# Patient Record
Sex: FEMALE | Race: WHITE | NOT HISPANIC OR LATINO | Employment: FULL TIME | URBAN - METROPOLITAN AREA
[De-identification: names, ages, dates, MRNs, and addresses within clinical notes are randomized per-mention and may not be internally consistent; named-entity substitution may affect disease eponyms.]

---

## 2023-01-11 ENCOUNTER — OFFICE VISIT (OUTPATIENT)
Dept: GASTROENTEROLOGY | Facility: CLINIC | Age: 73
End: 2023-01-11
Payer: MEDICARE

## 2023-01-11 ENCOUNTER — TELEPHONE (OUTPATIENT)
Dept: GASTROENTEROLOGY | Facility: CLINIC | Age: 73
End: 2023-01-11

## 2023-01-11 VITALS
OXYGEN SATURATION: 95 % | HEIGHT: 68 IN | BODY MASS INDEX: 24.4 KG/M2 | WEIGHT: 161 LBS | SYSTOLIC BLOOD PRESSURE: 152 MMHG | DIASTOLIC BLOOD PRESSURE: 88 MMHG | HEART RATE: 75 BPM

## 2023-01-11 DIAGNOSIS — R10.13 EPIGASTRIC PAIN: ICD-10-CM

## 2023-01-11 DIAGNOSIS — K44.9 HIATAL HERNIA: ICD-10-CM

## 2023-01-11 DIAGNOSIS — Z86.010 HISTORY OF COLON POLYPS: Primary | ICD-10-CM

## 2023-01-11 DIAGNOSIS — K21.9 GASTROESOPHAGEAL REFLUX DISEASE, UNSPECIFIED WHETHER ESOPHAGITIS PRESENT: Primary | ICD-10-CM

## 2023-01-11 DIAGNOSIS — Z80.0 FAMILY HISTORY OF COLON CANCER: ICD-10-CM

## 2023-01-11 DIAGNOSIS — Z86.010 HISTORY OF COLON POLYPS: ICD-10-CM

## 2023-01-11 PROCEDURE — 99205 OFFICE O/P NEW HI 60 MIN: CPT | Mod: S$GLB,,, | Performed by: INTERNAL MEDICINE

## 2023-01-11 PROCEDURE — 99205 PR OFFICE/OUTPT VISIT, NEW, LEVL V, 60-74 MIN: ICD-10-PCS | Mod: S$GLB,,, | Performed by: INTERNAL MEDICINE

## 2023-01-11 RX ORDER — METOPROLOL SUCCINATE 50 MG/1
50 TABLET, EXTENDED RELEASE ORAL
COMMUNITY
Start: 2022-10-23 | End: 2023-08-09

## 2023-01-11 RX ORDER — GABAPENTIN 100 MG/1
CAPSULE ORAL 3 TIMES DAILY
COMMUNITY
Start: 2022-09-21 | End: 2023-08-09

## 2023-01-11 RX ORDER — ZOLPIDEM TARTRATE 10 MG/1
10 TABLET ORAL
COMMUNITY
Start: 2022-12-09 | End: 2023-08-09

## 2023-01-11 RX ORDER — LISINOPRIL 10 MG/1
10 TABLET ORAL
COMMUNITY
Start: 2022-10-23 | End: 2023-08-09

## 2023-01-11 RX ORDER — SOD SULF/POT CHLORIDE/MAG SULF 1.479 G
12 TABLET ORAL DAILY
Qty: 24 TABLET | Refills: 0 | Status: SHIPPED | OUTPATIENT
Start: 2023-01-11 | End: 2023-08-09

## 2023-01-11 RX ORDER — ROSUVASTATIN CALCIUM 5 MG/1
5 TABLET, COATED ORAL NIGHTLY
COMMUNITY
Start: 2022-12-12 | End: 2023-08-09

## 2023-01-11 NOTE — TELEPHONE ENCOUNTER
"[facility]  [address] [place] [address]  Phone: [phone]  Fax: [phone]    History & Physical         Provider: Dr. Meryl Ruiz    Patient Name: Susi BOYER (age):1950  72 y.o.           Gender: female   Phone: [phone]     Referring Physician: Pino Juan     Vital Signs:   Height - 5' 7.5"  Weight - 161 lb  BMI -  24.84    Plan: EGD/Colonoscopy    Encounter Diagnoses   Name Primary?    History of colon polyps Yes    Hiatal hernia     Epigastric pain            History:      Past Medical History:   Diagnosis Date    BMI 24.0-24.9, adult     Colon polyp     Essential (primary) hypertension     Heart murmur     High cholesterol       Past Surgical History:   Procedure Laterality Date    AUGMENTATION OF BREAST      BREAST BIOPSY Left     COLONOSCOPY N/A     ENDOSCOPY N/A       Medication List with Changes/Refills   Current Medications    GABAPENTIN (NEURONTIN) 100 MG CAPSULE    Take by mouth 3 (three) times daily.    LISINOPRIL 10 MG TABLET    Take 10 mg by mouth.    METOPROLOL SUCCINATE (TOPROL-XL) 50 MG 24 HR TABLET    Take 50 mg by mouth.    ROSUVASTATIN (CRESTOR) 5 MG TABLET    Take 5 mg by mouth every evening.    SOD SULF-POT CHLORIDE-MAG SULF (SUTAB) 1.479-0.188- 0.225 GRAM TABLET    Take 12 tablets by mouth once daily. Take according to package instructions with indicated amount of water. No breakfast day before test. May substitute with Suprep, Clenpiq, Plenvu, Moviprep or GoLytely based on Rx plan and patient preference.    ZOLPIDEM (AMBIEN) 10 MG TAB    Take 10 mg by mouth.      Review of patient's allergies indicates:   Allergen Reactions    Doxycycline Other (See Comments)     EXTREME DIZZINESS    Gabapentin Rash     FEVER      Family History   Problem Relation Age of Onset    Colon cancer Mother 80    Heart attack Father     Esophageal cancer Neg Hx     " Pancreatic cancer Neg Hx     Liver cancer Neg Hx     Liver disease Neg Hx     Stomach cancer Neg Hx     Crohn's disease Neg Hx     Ulcerative colitis Neg Hx     Throat cancer Neg Hx       Social History     Tobacco Use    Smoking status: Never    Smokeless tobacco: Never   Substance Use Topics    Alcohol use: Yes     Comment: OCCASIONAL DRINK OF WINE    Drug use: Never        Physical Examination:     General Appearance:___________________________  HEENT: _____________________________________  Abdomen:____________________________________  Heart:________________________________________  Lungs:_______________________________________  Extremities:___________________________________  Skin:_________________________________________  Endocrine:____________________________________  Genitourinary:_________________________________  Neurological:__________________________________      Patient has been evaluated immediately prior to sedation and is medically cleared for endoscopy with IVCS as an ASA class: ______      Physician Signature: _________________________       Date: ________  Time: ________

## 2023-01-11 NOTE — PROGRESS NOTES
Clinic Note    Reason for visit:  The primary encounter diagnosis was Gastroesophageal reflux disease, unspecified whether esophagitis present. Diagnoses of Epigastric pain, Hiatal hernia, Family history of colon cancer, and History of colon polyps were also pertinent to this visit.    PCP: Pino Juan       HPI:  This is a 72 y.o. female who is here to establish care. Patient is due for colonoscopy. Mother had colon cancer at age 80. Has seen Dr. Bonilla in the past then saw Dr. Hopkins and had EGD/Colonoscopy in past and due for repeat colonoscopy. Last colonoscopy was 12/2017, has had colon polyps. She reports having intermittent reflux, not frequent. Takes Tums as needed. Denies dysphagia. Has taken panto and famotidine in the past for a few months but likes to avoid medicine so stopped these. She reports for past 6 months, if she stands up and walks after eating she will have epigastric discomfort/fullness, may last 5-10 minutes. If she slows down her walking, it gets better. Saw Dr. Gore because has h/o heart murmur and was told it was not due to her heart. Had stress test and heart cath, told 10% blockage on minor artery. She reports taking ibuprofen 400 mg a few times per week for headaches. Denies blood in stool, constipation, or diarrhea. She may have 2-3 BMs daily, soft stool but mostly just once daily.     Patient brought record - EGD with Dr. Hopkins 7/7/2021: HH, lower esophagus stricture (not dilated)    Review of Systems   Constitutional:  Negative for chills, diaphoresis, fatigue, fever and unexpected weight change.   HENT:  Negative for hearing loss, mouth sores, nosebleeds, postnasal drip, sore throat, trouble swallowing and voice change.    Eyes:  Negative for pain, discharge and eye dryness.   Respiratory:  Negative for apnea, cough, choking, chest tightness, shortness of breath and wheezing.    Cardiovascular:  Negative for chest pain, palpitations, leg swelling and claudication.    Gastrointestinal:  Positive for reflux. Negative for abdominal distention, abdominal pain, anal bleeding, blood in stool, change in bowel habit, constipation, diarrhea, nausea, rectal pain, vomiting, fecal incontinence and change in bowel habit.   Genitourinary:  Negative for bladder incontinence, difficulty urinating, dysuria, flank pain, frequency and hematuria.   Musculoskeletal:  Negative for arthralgias, back pain, joint swelling and joint deformity.   Integumentary:  Negative for color change, rash and wound.   Allergic/Immunologic: Negative for environmental allergies and food allergies.   Neurological:  Negative for seizures, facial asymmetry, speech difficulty, weakness, headaches and memory loss.   Hematological:  Negative for adenopathy. Does not bruise/bleed easily.   Psychiatric/Behavioral:  Negative for agitation, behavioral problems, confusion, hallucinations and sleep disturbance.       Past Medical History:   Diagnosis Date    BMI 24.0-24.9, adult     Colon polyp     Essential (primary) hypertension     Heart murmur     High cholesterol      Past Surgical History:   Procedure Laterality Date    AUGMENTATION OF BREAST      BREAST BIOPSY Left     COLONOSCOPY N/A     ENDOSCOPY N/A      Family History   Problem Relation Age of Onset    Colon cancer Mother 80    Heart attack Father     Esophageal cancer Neg Hx     Pancreatic cancer Neg Hx     Liver cancer Neg Hx     Liver disease Neg Hx     Stomach cancer Neg Hx     Crohn's disease Neg Hx     Ulcerative colitis Neg Hx     Throat cancer Neg Hx      Social History     Tobacco Use    Smoking status: Never    Smokeless tobacco: Never   Substance Use Topics    Alcohol use: Yes     Comment: OCCASIONAL DRINK OF WINE    Drug use: Never     Review of patient's allergies indicates:   Allergen Reactions    Doxycycline Other (See Comments)     EXTREME DIZZINESS    Gabapentin Rash     FEVER        Medication List with Changes/Refills   New Medications    SOD  "SULF-POT CHLORIDE-MAG SULF (SUTAB) 1.479-0.188- 0.225 GRAM TABLET    Take 12 tablets by mouth once daily. Take according to package instructions with indicated amount of water. No breakfast day before test. May substitute with Suprep, Clenpiq, Plenvu, Moviprep or GoLytely based on Rx plan and patient preference.   Current Medications    GABAPENTIN (NEURONTIN) 100 MG CAPSULE    Take by mouth 3 (three) times daily.    LISINOPRIL 10 MG TABLET    Take 10 mg by mouth.    METOPROLOL SUCCINATE (TOPROL-XL) 50 MG 24 HR TABLET    Take 50 mg by mouth.    ROSUVASTATIN (CRESTOR) 5 MG TABLET    Take 5 mg by mouth every evening.    ZOLPIDEM (AMBIEN) 10 MG TAB    Take 10 mg by mouth.         Vital Signs:  BP (!) 152/88   Pulse 75   Ht 5' 7.5" (1.715 m)   Wt 73 kg (161 lb)   SpO2 95%   BMI 24.84 kg/m²         Physical Exam  Constitutional:       General: She is not in acute distress.     Appearance: Normal appearance. She is well-developed. She is not ill-appearing or toxic-appearing.   HENT:      Head: Normocephalic and atraumatic.      Nose: Nose normal.      Mouth/Throat:      Mouth: Mucous membranes are moist.      Pharynx: Oropharynx is clear. No oropharyngeal exudate or posterior oropharyngeal erythema.   Eyes:      General: Lids are normal. No scleral icterus.        Right eye: No discharge.         Left eye: No discharge.      Extraocular Movements: Extraocular movements intact.      Conjunctiva/sclera: Conjunctivae normal.   Cardiovascular:      Rate and Rhythm: Normal rate and regular rhythm.      Pulses:           Radial pulses are 2+ on the right side and 2+ on the left side.   Pulmonary:      Effort: Pulmonary effort is normal. No respiratory distress.      Breath sounds: No stridor. No wheezing or rhonchi.   Abdominal:      General: Bowel sounds are normal. There is no distension.      Palpations: Abdomen is soft. There is no fluid wave, hepatomegaly, splenomegaly or mass.      Tenderness: There is no abdominal " tenderness. There is no guarding or rebound.   Musculoskeletal:      Cervical back: Full passive range of motion without pain.      Right lower leg: No edema.      Left lower leg: No edema.   Lymphadenopathy:      Cervical: No cervical adenopathy.   Skin:     General: Skin is warm and dry.      Capillary Refill: Capillary refill takes less than 2 seconds.      Coloration: Skin is not cyanotic, jaundiced or pale.      Findings: No rash.   Neurological:      General: No focal deficit present.      Mental Status: She is alert and oriented to person, place, and time.   Psychiatric:         Mood and Affect: Mood normal.         Behavior: Behavior is cooperative.          All of the data above and below has been reviewed by myself and any further interpretations will be reflected in the assessment and plan.   The data includes review of external notes, and independent interpretation of lab results, procedures, x-rays, and imaging reports.      Assessment:  Gastroesophageal reflux disease, unspecified whether esophagitis present    Epigastric pain  -     Ambulatory Referral to External Surgery    Hiatal hernia  -     Ambulatory Referral to External Surgery    Family history of colon cancer    History of colon polyps  -     Ambulatory Referral to External Surgery    Other orders  -     sod sulf-pot chloride-mag sulf (SUTAB) 1.479-0.188- 0.225 gram tablet; Take 12 tablets by mouth once daily. Take according to package instructions with indicated amount of water. No breakfast day before test. May substitute with Suprep, Clenpiq, Plenvu, Moviprep or GoLytely based on Rx plan and patient preference.  Dispense: 24 tablet; Refill: 0      GERD v ulcer v Delayed gastric emptying v H. Pylori. Plan for EGD with gastric biopsies.      Recommendations:  Schedule upper and lower endoscopies.     Risks, benefits, and alternatives of medical management, any associated procedures, and/or treatment discussed with the patient. Patient given  opportunity to ask questions and voices understanding. Patient has elected to proceed with the recommended care modalities as discussed.    Follow up in about 6 months (around 7/11/2023).    Order summary:  Orders Placed This Encounter   Procedures    Ambulatory Referral to External Surgery        Instructed patient to notify my office if they have not been contacted within two weeks after any procedures, submitting any samples (biopsies, blood, stool, urine, etc.) or after any imaging (X-ray, CT, MRI, etc.).     Meryl Ruiz MD    This document may have been created using a voice recognition transcribing system. Incorrect words or phrases may have been missed during proofreading. Please interpret accordingly or contact me for clarification.

## 2023-03-20 ENCOUNTER — TELEPHONE (OUTPATIENT)
Dept: GASTROENTEROLOGY | Facility: CLINIC | Age: 73
End: 2023-03-20
Payer: MEDICARE

## 2023-03-20 NOTE — TELEPHONE ENCOUNTER
I spoke to patient, she just wanted to know if she should take her blood pressure rx before procedure.  Instructed to take with a sip of water day of procedure.     ----- Message from Kay Toney sent at 3/20/2023  9:36 AM CDT -----  Contact: self  Type:  Patient Returning Call    Who Called:Susi Duque    Who Left Message for Patient:unsure  Does the patient know what this is regarding?:pre op questions  Would the patient rather a call back or a response via [facility]? Call back  Best Call Back Number:[phone]    Additional Information: pt has questions about pre op

## 2023-03-23 ENCOUNTER — OUTSIDE PLACE OF SERVICE (OUTPATIENT)
Dept: GASTROENTEROLOGY | Facility: CLINIC | Age: 73
End: 2023-03-23

## 2023-03-23 LAB — CRC RECOMMENDATION EXT: NORMAL

## 2023-03-23 PROCEDURE — 45380 PR COLONOSCOPY,BIOPSY: ICD-10-PCS | Mod: PT,,, | Performed by: INTERNAL MEDICINE

## 2023-03-23 PROCEDURE — 43239 EGD BIOPSY SINGLE/MULTIPLE: CPT | Mod: 51,,, | Performed by: INTERNAL MEDICINE

## 2023-03-23 PROCEDURE — 43239 PR EGD, FLEX, W/BIOPSY, SGL/MULTI: ICD-10-PCS | Mod: 51,,, | Performed by: INTERNAL MEDICINE

## 2023-03-23 PROCEDURE — 45380 COLONOSCOPY AND BIOPSY: CPT | Mod: PT,,, | Performed by: INTERNAL MEDICINE

## 2023-03-28 ENCOUNTER — TELEPHONE (OUTPATIENT)
Dept: GASTROENTEROLOGY | Facility: CLINIC | Age: 73
End: 2023-03-28
Payer: MEDICARE

## 2023-03-28 NOTE — TELEPHONE ENCOUNTER
I spoke to patient and gave her results of her EGD and colonoscopy results.----- Message from Kay Toney sent at 3/28/2023  1:35 PM CDT -----  Contact: self  Type:  Test Results    Who Called: Susi Duque    Name of Test (Lab/Mammo/Etc): lab  Date of Test: 03/2023  Ordering Provider: omar  Where the test was performed[facility]  Would the patient rather a call back or a response via [facility]? Call back  Best Call Back Number: [phone]    Additional Information:  n/a    03/2023

## 2023-03-28 NOTE — TELEPHONE ENCOUNTER
DBx chr ditis w/gastric hetero, GBx wnl w/o Hp, EBx reflux, 2 TA, repeat colonoscopy in 5 years.     Notify patient. No signs of infection, precancerous cells or Celiac disease on the upper endoscopy biopsies.  The colon polyps were benign. Repeat colonoscopy in 5 years.  Update in Health Maintenance section of Epic. Her Sx seem to be acid reflux related. Okay to take famotidine as needed and notify me sooner if any issues. Keep follow up OV to discuss further.  NBP

## 2023-03-29 NOTE — TELEPHONE ENCOUNTER
March 28, 2023  Mayela Newby LPN DC     1:55 PM  Note  I spoke to patient and gave her results of her EGD and colonoscopy results.----- Message from Kay Toney sent at 3/28/2023  1:35 PM CDT -----  Contact: self  Type:  Test Results     Who Called: Susi Duque

## 2023-04-28 ENCOUNTER — DOCUMENTATION ONLY (OUTPATIENT)
Dept: GASTROENTEROLOGY | Facility: CLINIC | Age: 73
End: 2023-04-28
Payer: MEDICARE

## 2023-08-09 ENCOUNTER — OFFICE VISIT (OUTPATIENT)
Dept: GASTROENTEROLOGY | Facility: CLINIC | Age: 73
End: 2023-08-09
Payer: MEDICARE

## 2023-08-09 VITALS
WEIGHT: 164.63 LBS | OXYGEN SATURATION: 96 % | BODY MASS INDEX: 25.84 KG/M2 | HEIGHT: 67 IN | DIASTOLIC BLOOD PRESSURE: 87 MMHG | HEART RATE: 71 BPM | SYSTOLIC BLOOD PRESSURE: 161 MMHG

## 2023-08-09 DIAGNOSIS — Z86.010 HISTORY OF COLON POLYPS: ICD-10-CM

## 2023-08-09 DIAGNOSIS — K21.9 GASTROESOPHAGEAL REFLUX DISEASE, UNSPECIFIED WHETHER ESOPHAGITIS PRESENT: Primary | ICD-10-CM

## 2023-08-09 DIAGNOSIS — R10.13 EPIGASTRIC PAIN: ICD-10-CM

## 2023-08-09 DIAGNOSIS — Z80.0 FAMILY HISTORY OF COLON CANCER: ICD-10-CM

## 2023-08-09 PROBLEM — Z86.0100 HISTORY OF COLON POLYPS: Status: ACTIVE | Noted: 2023-08-09

## 2023-08-09 PROCEDURE — 99214 PR OFFICE/OUTPT VISIT, EST, LEVL IV, 30-39 MIN: ICD-10-PCS | Mod: S$GLB,,, | Performed by: INTERNAL MEDICINE

## 2023-08-09 PROCEDURE — 99214 OFFICE O/P EST MOD 30 MIN: CPT | Mod: S$GLB,,, | Performed by: INTERNAL MEDICINE

## 2023-08-09 RX ORDER — METOPROLOL SUCCINATE 50 MG/1
50 TABLET, EXTENDED RELEASE ORAL
COMMUNITY

## 2023-08-09 RX ORDER — ASPIRIN 81 MG/1
81 TABLET ORAL
COMMUNITY

## 2023-08-09 RX ORDER — ROSUVASTATIN CALCIUM 5 MG/1
TABLET, COATED ORAL DAILY
COMMUNITY

## 2023-08-09 RX ORDER — FAMOTIDINE 40 MG/1
40 TABLET, FILM COATED ORAL 2 TIMES DAILY
Qty: 180 TABLET | Refills: 1 | Status: SHIPPED | OUTPATIENT
Start: 2023-08-09 | End: 2023-11-03 | Stop reason: SDUPTHER

## 2023-08-09 RX ORDER — LISINOPRIL 10 MG/1
10 TABLET ORAL
COMMUNITY

## 2023-08-09 NOTE — LETTER
August 9, 2023        Pino Juan MD  [address]  [place] [address]             [facility]  [address] [place] [address]  Phone: [phone]  Fax: [phone]   Patient: Susi Duque   MR Number: [id number]   YOB: 1950   Date of Visit: 8/9/2023       Dear Dr. Juan:    Thank you for referring Susi Duque to me for evaluation. Attached you will find relevant portions of my assessment and plan of care.    If you have questions, please do not hesitate to call me. I look forward to following Susi Duque along with you.    Sincerely,      Meryl Ruiz MD            CC  No Recipients    Enclosure

## 2023-08-09 NOTE — PROGRESS NOTES
Clinic Note    Reason for visit:  The primary encounter diagnosis was Gastroesophageal reflux disease, unspecified whether esophagitis present. Diagnoses of Epigastric pain, History of colon polyps, and Family history of colon cancer were also pertinent to this visit.    PCP: Pino Juan       HPI:  This is a 73 y.o. female here for follow-up. Patient with intermittent reflux. She was having epigastric discomfort after eating as well that is attributed to acid reflux. She states if she stands up and walks after eating she will have epigastric discomfort/fullness, may last 5-10 minutes. If she slows down her walking, it gets better. She takes Tums as needed. She has daily BMs.      EGD/Colonoscopy 3/23/2023: Small HH, DBx chr ditis w/gastric hetero, GBx wnl w/o Hp, EBx reflux, 2 TA, mild diverticulosis of the sigmoid colon. Repeat colonoscopy in 5 years.     Review of Systems   Constitutional:  Negative for fatigue, fever and unexpected weight change.   HENT:  Negative for mouth sores, postnasal drip, sore throat and trouble swallowing.    Eyes:  Negative for pain, discharge and eye dryness.   Respiratory:  Positive for apnea. Negative for cough, choking, chest tightness, shortness of breath and wheezing.    Cardiovascular:  Negative for chest pain, palpitations and leg swelling.   Gastrointestinal:  Positive for reflux. Negative for abdominal distention, abdominal pain, anal bleeding, blood in stool, change in bowel habit, constipation, diarrhea, nausea, rectal pain, vomiting, fecal incontinence and change in bowel habit.   Genitourinary:  Negative for bladder incontinence, dysuria and hematuria.   Musculoskeletal:  Negative for arthralgias, back pain and joint swelling.   Integumentary:  Negative for color change and rash.   Allergic/Immunologic: Negative for environmental allergies and food allergies.   Neurological:  Negative for seizures and headaches.   Hematological:  Negative for adenopathy. Does not  bruise/bleed easily.        Past Medical History:   Diagnosis Date    BMI 24.0-24.9, adult     Colon polyp     Essential (primary) hypertension     Heart murmur     High cholesterol      Past Surgical History:   Procedure Laterality Date    AUGMENTATION OF BREAST      BREAST BIOPSY Left     COLONOSCOPY N/A     ENDOSCOPY N/A      Family History   Problem Relation Age of Onset    Colon cancer Mother 80    Heart attack Father     Esophageal cancer Neg Hx     Pancreatic cancer Neg Hx     Liver cancer Neg Hx     Liver disease Neg Hx     Stomach cancer Neg Hx     Crohn's disease Neg Hx     Ulcerative colitis Neg Hx     Throat cancer Neg Hx      Social History     Tobacco Use    Smoking status: Never    Smokeless tobacco: Never   Substance Use Topics    Alcohol use: Yes     Comment: OCCASIONAL DRINK OF WINE    Drug use: Never     Review of patient's allergies indicates:   Allergen Reactions    Doxycycline Other (See Comments)     EXTREME DIZZINESS    Gabapentin Rash     FEVER        Medication List with Changes/Refills   New Medications    FAMOTIDINE (PEPCID) 40 MG TABLET    Take 1 tablet (40 mg total) by mouth 2 (two) times daily.   Current Medications    ASPIRIN (ECOTRIN) 81 MG EC TABLET    Take 81 mg by mouth.    LISINOPRIL 10 MG TABLET    Take 10 mg by mouth.    METOPROLOL SUCCINATE (TOPROL-XL) 50 MG 24 HR TABLET    Take 50 mg by mouth.    ROSUVASTATIN (CRESTOR) 5 MG TABLET    Take by mouth once daily.   Discontinued Medications    GABAPENTIN (NEURONTIN) 100 MG CAPSULE    Take by mouth 3 (three) times daily.    LISINOPRIL 10 MG TABLET    Take 10 mg by mouth.    METOPROLOL SUCCINATE (TOPROL-XL) 50 MG 24 HR TABLET    Take 50 mg by mouth.    ROSUVASTATIN (CRESTOR) 5 MG TABLET    Take 5 mg by mouth every evening.    SOD SULF-POT CHLORIDE-MAG SULF (SUTAB) 1.479-0.188- 0.225 GRAM TABLET    Take 12 tablets by mouth once daily. Take according to package instructions with indicated amount of water. No breakfast day before test.  "May substitute with Suprep, Clenpiq, Plenvu, Moviprep or GoLytely based on Rx plan and patient preference.    ZOLPIDEM (AMBIEN) 10 MG TAB    Take 10 mg by mouth.         Vital Signs:  BP (!) 161/87   Pulse 71   Ht 5' 7" (1.702 m)   Wt 74.7 kg (164 lb 9.6 oz)   SpO2 96%   BMI 25.78 kg/m²         Physical Exam  Vitals reviewed.   Constitutional:       General: She is awake. She is not in acute distress.     Appearance: Normal appearance. She is well-developed. She is not ill-appearing, toxic-appearing or diaphoretic.   HENT:      Head: Normocephalic and atraumatic.      Nose: Nose normal.      Mouth/Throat:      Mouth: Mucous membranes are moist.      Pharynx: Oropharynx is clear. No oropharyngeal exudate or posterior oropharyngeal erythema.   Eyes:      General: Lids are normal. Gaze aligned appropriately. No scleral icterus.        Right eye: No discharge.         Left eye: No discharge.      Conjunctiva/sclera: Conjunctivae normal.   Neck:      Trachea: Trachea normal.   Cardiovascular:      Rate and Rhythm: Normal rate and regular rhythm.      Pulses:           Radial pulses are 2+ on the right side and 2+ on the left side.   Pulmonary:      Effort: Pulmonary effort is normal. No respiratory distress.      Breath sounds: No stridor. No wheezing.   Chest:      Chest wall: No tenderness.   Abdominal:      General: Bowel sounds are normal. There is no distension.      Palpations: Abdomen is soft. There is no fluid wave, hepatomegaly or mass.      Tenderness: There is no abdominal tenderness. There is no guarding or rebound.   Musculoskeletal:         General: No tenderness or deformity.      Cervical back: Full passive range of motion without pain and neck supple. No tenderness.      Right lower leg: No edema.      Left lower leg: No edema.   Lymphadenopathy:      Cervical: No cervical adenopathy.   Skin:     General: Skin is warm and dry.      Capillary Refill: Capillary refill takes less than 2 seconds.      " Coloration: Skin is not cyanotic, jaundiced or pale.   Neurological:      General: No focal deficit present.      Mental Status: She is alert and oriented to person, place, and time.      Motor: No tremor.   Psychiatric:         Attention and Perception: Attention normal.         Mood and Affect: Mood and affect normal.         Speech: Speech normal.         Behavior: Behavior normal. Behavior is cooperative.            All of the data above and below has been reviewed by myself and any further interpretations will be reflected in the assessment and plan.   The data includes review of external notes, and independent interpretation of lab results, procedures, x-rays, and imaging reports.      Assessment:  Gastroesophageal reflux disease, unspecified whether esophagitis present  -     famotidine (PEPCID) 40 MG tablet; Take 1 tablet (40 mg total) by mouth 2 (two) times daily.  Dispense: 180 tablet; Refill: 1    Epigastric pain    History of colon polyps  Comments:  Colonoscopy due 3/2028    Family history of colon cancer    Epigastric pain likely related to acid reflux. Trial of famotidine 40 BID. Wants to avoid pantoprazole.      Recommendations:  Begin taking famotidine 40 mg twice a day. After 3 weeks, may go down to once daily dosing.   Trial of protein drink before activity.    Risks, benefits, and alternatives of medical management, any associated procedures, and/or treatment discussed with the patient. Patient given opportunity to ask questions and voices understanding. Patient has elected to proceed with the recommended care modalities as discussed.    Instructed patient to notify my office if they have not been contacted within two weeks after any procedures, submitting any samples (biopsies, blood, stool, urine, etc.) or after any imaging (X-ray, CT, MRI, etc.).     Follow up in about 1 year (around 8/9/2024).    Order summary:  No orders of the defined types were placed in this encounter.         This  document may have been created using a voice recognition transcribing system. Incorrect words or phrases may have been missed during proofreading. Please interpret accordingly or contact me for clarification.     Meryl Ruiz MD

## 2024-02-05 DIAGNOSIS — K21.9 GASTROESOPHAGEAL REFLUX DISEASE, UNSPECIFIED WHETHER ESOPHAGITIS PRESENT: ICD-10-CM

## 2024-02-05 RX ORDER — FAMOTIDINE 40 MG/1
40 TABLET, FILM COATED ORAL 2 TIMES DAILY
Qty: 180 TABLET | Refills: 1 | Status: SHIPPED | OUTPATIENT
Start: 2024-02-05

## 2024-08-13 ENCOUNTER — OFFICE VISIT (OUTPATIENT)
Dept: GASTROENTEROLOGY | Facility: CLINIC | Age: 74
End: 2024-08-13
Payer: MEDICARE

## 2024-08-13 VITALS
BODY MASS INDEX: 25.27 KG/M2 | DIASTOLIC BLOOD PRESSURE: 81 MMHG | WEIGHT: 161 LBS | SYSTOLIC BLOOD PRESSURE: 144 MMHG | HEIGHT: 67 IN | OXYGEN SATURATION: 96 % | HEART RATE: 66 BPM | RESPIRATION RATE: 16 BRPM

## 2024-08-13 DIAGNOSIS — R10.13 EPIGASTRIC PAIN: ICD-10-CM

## 2024-08-13 DIAGNOSIS — Z86.010 HISTORY OF COLON POLYPS: ICD-10-CM

## 2024-08-13 DIAGNOSIS — K21.9 GASTROESOPHAGEAL REFLUX DISEASE, UNSPECIFIED WHETHER ESOPHAGITIS PRESENT: Primary | ICD-10-CM

## 2024-08-13 DIAGNOSIS — Z80.0 FAMILY HISTORY OF COLON CANCER: ICD-10-CM

## 2024-08-13 PROCEDURE — 99213 OFFICE O/P EST LOW 20 MIN: CPT | Mod: S$GLB,,, | Performed by: INTERNAL MEDICINE

## 2024-08-13 RX ORDER — LISINOPRIL 20 MG/1
20 TABLET ORAL 2 TIMES DAILY
COMMUNITY
Start: 2024-07-03

## 2024-08-13 RX ORDER — FAMOTIDINE 20 MG/1
20 TABLET, FILM COATED ORAL DAILY
Qty: 90 TABLET | Refills: 3 | Status: SHIPPED | OUTPATIENT
Start: 2024-08-13 | End: 2025-08-13

## 2024-08-13 RX ORDER — AMLODIPINE BESYLATE 5 MG/1
5 TABLET ORAL DAILY
COMMUNITY
Start: 2024-06-02

## 2024-08-13 RX ORDER — FAMOTIDINE 20 MG/1
20 TABLET, FILM COATED ORAL DAILY
COMMUNITY
End: 2024-08-13 | Stop reason: SDUPTHER

## 2024-08-13 RX ORDER — METOPROLOL TARTRATE 25 MG/1
25 TABLET, FILM COATED ORAL DAILY
COMMUNITY

## 2024-08-13 NOTE — PATIENT INSTRUCTIONS
Try taking famotidine 20mg every other day. If having symptoms ok to resume daily dosing.    Please notify my office if you have not been contacted within two weeks after any procedures, submitting any samples (biopsies, blood, stool, urine, etc.) or after any imaging (X-ray, CT, MRI, etc.).

## 2024-08-13 NOTE — PROGRESS NOTES
Clinic Note    Reason for visit:  The primary encounter diagnosis was Gastroesophageal reflux disease, unspecified whether esophagitis present. Diagnoses of Epigastric pain, History of colon polyps, and Family history of colon cancer were also pertinent to this visit.    PCP: Pino Juan       HPI:  This is a 74 y.o. female here for follow-up. Patient with intermittent reflux. She was having epigastric discomfort after eating as well that is attributed to acid reflux. Last visit she was given famotidine 40 mg BID to try and was to go to once daily after a few weeks if working well for her reflux. She has been taking 1/2 tablet a day which controls her reflux well. Denies abdominal pain, change in BH, rectal bleeding.    EGD/Colonoscopy 3/23/2023: Small HH, DBx chr ditis w/gastric hetero, GBx wnl w/o Hp, EBx reflux, 2 TA, mild diverticulosis of the sigmoid colon. Repeat colonoscopy in 5 years.     Review of Systems   Constitutional:  Negative for fatigue, fever and unexpected weight change.   HENT:  Negative for mouth sores, postnasal drip, sore throat and trouble swallowing.    Eyes:  Negative for pain, discharge and eye dryness.   Respiratory:  Negative for apnea, cough, choking, chest tightness, shortness of breath and wheezing.    Cardiovascular:  Negative for chest pain, palpitations and leg swelling.   Gastrointestinal:  Positive for reflux. Negative for abdominal distention, abdominal pain, anal bleeding, blood in stool, change in bowel habit, constipation, diarrhea, nausea, rectal pain, vomiting and fecal incontinence.   Genitourinary:  Negative for bladder incontinence, dysuria and hematuria.   Musculoskeletal:  Negative for arthralgias, back pain and joint swelling.   Integumentary:  Negative for color change and rash.   Allergic/Immunologic: Negative for environmental allergies and food allergies.   Neurological:  Negative for seizures and headaches.   Hematological:  Negative for adenopathy. Does  not bruise/bleed easily.        Past Medical History:   Diagnosis Date    BMI 24.0-24.9, adult     Colon polyp     Essential (primary) hypertension     Heart murmur     High cholesterol      Past Surgical History:   Procedure Laterality Date    AUGMENTATION OF BREAST      BREAST BIOPSY Left     COLONOSCOPY N/A     ENDOSCOPY N/A      Family History   Problem Relation Name Age of Onset    Colon cancer Mother  80    Heart attack Father      Esophageal cancer Neg Hx      Pancreatic cancer Neg Hx      Liver cancer Neg Hx      Liver disease Neg Hx      Stomach cancer Neg Hx      Crohn's disease Neg Hx      Ulcerative colitis Neg Hx      Throat cancer Neg Hx       Social History     Tobacco Use    Smoking status: Never    Smokeless tobacco: Never   Substance Use Topics    Alcohol use: Yes     Comment: OCCASIONAL DRINK OF WINE    Drug use: Never     Review of patient's allergies indicates:   Allergen Reactions    Doxycycline Other (See Comments)     EXTREME DIZZINESS    Levaquin [levofloxacin] Other (See Comments)     Cause joint pain    Gabapentin Rash     FEVER        Medication List with Changes/Refills   Current Medications    AMLODIPINE (NORVASC) 5 MG TABLET    Take 5 mg by mouth once daily.    ASPIRIN (ECOTRIN) 81 MG EC TABLET    Take 81 mg by mouth.    LISINOPRIL (PRINIVIL,ZESTRIL) 20 MG TABLET    Take 20 mg by mouth 2 (two) times daily.    METOPROLOL TARTRATE (LOPRESSOR) 25 MG TABLET    Take 25 mg by mouth once daily.    ROSUVASTATIN (CRESTOR) 5 MG TABLET    Take by mouth once daily.   Changed and/or Refilled Medications    Modified Medication Previous Medication    FAMOTIDINE (PEPCID) 20 MG TABLET famotidine (PEPCID) 20 MG tablet       Take 1 tablet (20 mg total) by mouth once daily.    Take 20 mg by mouth once daily.   Discontinued Medications    FAMOTIDINE (PEPCID) 40 MG TABLET    TAKE 1 TABLET BY MOUTH TWICE A DAY    LISINOPRIL 10 MG TABLET    Take 10 mg by mouth.    METOPROLOL SUCCINATE (TOPROL-XL) 50 MG 24 HR  "TABLET    Take 50 mg by mouth.         Vital Signs:  BP (!) 144/81 (BP Location: Left arm, Patient Position: Sitting, BP Method: Medium (Automatic))   Pulse 66   Resp 16   Ht 5' 7" (1.702 m)   Wt 73 kg (161 lb)   SpO2 96%   BMI 25.22 kg/m²         Physical Exam  Vitals reviewed.   Constitutional:       General: She is awake. She is not in acute distress.     Appearance: Normal appearance. She is well-developed. She is not ill-appearing, toxic-appearing or diaphoretic.   HENT:      Head: Normocephalic and atraumatic.      Nose: Nose normal.      Mouth/Throat:      Mouth: Mucous membranes are moist.      Pharynx: Oropharynx is clear. No oropharyngeal exudate or posterior oropharyngeal erythema.   Eyes:      General: Lids are normal. Gaze aligned appropriately. No scleral icterus.        Right eye: No discharge.         Left eye: No discharge.      Conjunctiva/sclera: Conjunctivae normal.   Neck:      Trachea: Trachea normal.   Cardiovascular:      Rate and Rhythm: Normal rate and regular rhythm.      Pulses:           Radial pulses are 2+ on the right side and 2+ on the left side.   Pulmonary:      Effort: Pulmonary effort is normal. No respiratory distress.      Breath sounds: No stridor. No wheezing.   Chest:      Chest wall: No tenderness.   Abdominal:      General: Bowel sounds are normal. There is no distension.      Palpations: Abdomen is soft. There is no fluid wave, hepatomegaly or mass.      Tenderness: There is no abdominal tenderness. There is no guarding or rebound.   Musculoskeletal:         General: No tenderness or deformity.      Cervical back: Full passive range of motion without pain and neck supple. No tenderness.      Right lower leg: No edema.      Left lower leg: No edema.   Lymphadenopathy:      Cervical: No cervical adenopathy.   Skin:     General: Skin is warm and dry.      Capillary Refill: Capillary refill takes less than 2 seconds.      Coloration: Skin is not cyanotic, jaundiced or " pale.   Neurological:      General: No focal deficit present.      Mental Status: She is alert and oriented to person, place, and time.      Motor: No tremor.   Psychiatric:         Attention and Perception: Attention normal.         Mood and Affect: Mood and affect normal.         Speech: Speech normal.         Behavior: Behavior normal. Behavior is cooperative.            All of the data above and below has been reviewed by myself and any further interpretations will be reflected in the assessment and plan.   The data includes review of external notes, and independent interpretation of lab results, procedures, x-rays, and imaging reports.      Assessment:  Gastroesophageal reflux disease, unspecified whether esophagitis present  -     famotidine (PEPCID) 20 MG tablet; Take 1 tablet (20 mg total) by mouth once daily.  Dispense: 90 tablet; Refill: 3    Epigastric pain    History of colon polyps    Family history of colon cancer    Colon due 2028  GERD, doing well with famotidine 20mg. Discussed to attempt QOD dosing.     Recommendations:    Try taking famotidine 20mg every other day. If having symptoms ok to resume daily dosing.    Risks, benefits, and alternatives of medical management, any associated procedures, and/or treatment discussed with the patient. Patient given opportunity to ask questions and voices understanding. Patient has elected to proceed with the recommended care modalities as discussed.    Instructed patient to notify my office if they have not been contacted within two weeks after any procedures, submitting any samples (biopsies, blood, stool, urine, etc.) or after any imaging (X-ray, CT, MRI, etc.).     Follow up in about 1 year (around 8/13/2025).    Order summary:  No orders of the defined types were placed in this encounter.     This assessment, plan, and documentation was performed in collaboration with Viktoria Pope NP.     This document may have been created using a voice recognition  transcribing system. Incorrect words or phrases may have been missed during proofreading. Please interpret accordingly or contact me for clarification.     Meryl Ruiz MD

## 2024-08-13 NOTE — LETTER
August 13, 2024        Pino Juan MD  [address]  [place] [address]             [facility]  [address] [address]  Phone: [phone]  Fax: [phone]   Patient: Susi Duque   MR Number: [id number]   YOB: 1950   Date of Visit: 8/13/2024       Dear Dr. Juan:    Thank you for referring Susi Duque to me for evaluation. Attached you will find relevant portions of my assessment and plan of care.    If you have questions, please do not hesitate to call me. I look forward to following Susi Duque along with you.    Sincerely,      Meryl Ruiz MD            CC  No Recipients    Enclosure

## 2025-08-14 ENCOUNTER — OFFICE VISIT (OUTPATIENT)
Dept: GASTROENTEROLOGY | Facility: CLINIC | Age: 75
End: 2025-08-14
Payer: MEDICARE

## 2025-08-14 VITALS
OXYGEN SATURATION: 94 % | SYSTOLIC BLOOD PRESSURE: 151 MMHG | DIASTOLIC BLOOD PRESSURE: 79 MMHG | WEIGHT: 163.38 LBS | HEART RATE: 65 BPM | BODY MASS INDEX: 25.64 KG/M2 | HEIGHT: 67 IN

## 2025-08-14 DIAGNOSIS — Z86.0100 HISTORY OF COLON POLYPS: ICD-10-CM

## 2025-08-14 DIAGNOSIS — K21.9 GASTROESOPHAGEAL REFLUX DISEASE, UNSPECIFIED WHETHER ESOPHAGITIS PRESENT: Primary | ICD-10-CM

## 2025-08-14 DIAGNOSIS — R10.13 EPIGASTRIC PAIN: ICD-10-CM

## 2025-08-14 DIAGNOSIS — Z80.0 FAMILY HISTORY OF COLON CANCER: ICD-10-CM

## 2025-08-14 PROCEDURE — 99214 OFFICE O/P EST MOD 30 MIN: CPT | Mod: S$PBB,,,

## 2025-08-14 RX ORDER — ZOLPIDEM TARTRATE 5 MG/1
5 TABLET ORAL NIGHTLY PRN
COMMUNITY
Start: 2025-03-24

## 2025-08-14 RX ORDER — METOPROLOL SUCCINATE 50 MG/1
50 TABLET, EXTENDED RELEASE ORAL
COMMUNITY
Start: 2025-01-18

## 2025-08-14 RX ORDER — FAMOTIDINE 40 MG/1
40 TABLET, FILM COATED ORAL NIGHTLY PRN
Qty: 90 TABLET | Refills: 3 | Status: SHIPPED | OUTPATIENT
Start: 2025-08-14